# Patient Record
Sex: MALE | Race: WHITE | Employment: FULL TIME | URBAN - METROPOLITAN AREA
[De-identification: names, ages, dates, MRNs, and addresses within clinical notes are randomized per-mention and may not be internally consistent; named-entity substitution may affect disease eponyms.]

---

## 2021-11-30 ENCOUNTER — APPOINTMENT (OUTPATIENT)
Dept: GENERAL RADIOLOGY | Age: 71
End: 2021-11-30
Payer: COMMERCIAL

## 2021-11-30 ENCOUNTER — HOSPITAL ENCOUNTER (EMERGENCY)
Age: 71
Discharge: HOME | End: 2021-11-30
Payer: COMMERCIAL

## 2021-11-30 VITALS
OXYGEN SATURATION: 97 % | TEMPERATURE: 98.3 F | RESPIRATION RATE: 20 BRPM | DIASTOLIC BLOOD PRESSURE: 82 MMHG | BODY MASS INDEX: 29.89 KG/M2 | HEART RATE: 66 BPM | HEIGHT: 66 IN | WEIGHT: 186 LBS | SYSTOLIC BLOOD PRESSURE: 148 MMHG

## 2021-11-30 DIAGNOSIS — R00.2 PALPITATIONS: Primary | ICD-10-CM

## 2021-11-30 LAB
A/G RATIO: 1.3 (ref 1.1–2.2)
ALBUMIN SERPL-MCNC: 4 G/DL (ref 3.4–5)
ALP BLD-CCNC: 57 U/L (ref 40–129)
ALT SERPL-CCNC: 22 U/L (ref 10–40)
ANION GAP SERPL CALCULATED.3IONS-SCNC: 11 MMOL/L (ref 3–16)
AST SERPL-CCNC: 18 U/L (ref 15–37)
BASOPHILS ABSOLUTE: 0 K/UL (ref 0–0.2)
BASOPHILS RELATIVE PERCENT: 0.5 %
BILIRUB SERPL-MCNC: 1.1 MG/DL (ref 0–1)
BILIRUBIN URINE: NEGATIVE
BLOOD, URINE: NEGATIVE
BUN BLDV-MCNC: 14 MG/DL (ref 7–20)
CALCIUM SERPL-MCNC: 9.6 MG/DL (ref 8.3–10.6)
CHLORIDE BLD-SCNC: 100 MMOL/L (ref 99–110)
CLARITY: CLEAR
CO2: 26 MMOL/L (ref 21–32)
COLOR: YELLOW
CREAT SERPL-MCNC: 1 MG/DL (ref 0.8–1.3)
EOSINOPHILS ABSOLUTE: 0.2 K/UL (ref 0–0.6)
EOSINOPHILS RELATIVE PERCENT: 2.6 %
GFR AFRICAN AMERICAN: >60
GFR NON-AFRICAN AMERICAN: >60
GLUCOSE BLD-MCNC: 103 MG/DL (ref 70–99)
GLUCOSE URINE: NEGATIVE MG/DL
HCT VFR BLD CALC: 46.5 % (ref 40.5–52.5)
HEMOGLOBIN: 14.7 G/DL (ref 13.5–17.5)
KETONES, URINE: NEGATIVE MG/DL
LEUKOCYTE ESTERASE, URINE: NEGATIVE
LYMPHOCYTES ABSOLUTE: 1.3 K/UL (ref 1–5.1)
LYMPHOCYTES RELATIVE PERCENT: 20 %
MCH RBC QN AUTO: 25.8 PG (ref 26–34)
MCHC RBC AUTO-ENTMCNC: 31.6 G/DL (ref 31–36)
MCV RBC AUTO: 81.5 FL (ref 80–100)
MICROSCOPIC EXAMINATION: NORMAL
MONOCYTES ABSOLUTE: 0.6 K/UL (ref 0–1.3)
MONOCYTES RELATIVE PERCENT: 9.2 %
NEUTROPHILS ABSOLUTE: 4.4 K/UL (ref 1.7–7.7)
NEUTROPHILS RELATIVE PERCENT: 67.7 %
NITRITE, URINE: NEGATIVE
PDW BLD-RTO: 15.8 % (ref 12.4–15.4)
PH UA: 6.5 (ref 5–8)
PLATELET # BLD: 210 K/UL (ref 135–450)
PMV BLD AUTO: 8.4 FL (ref 5–10.5)
POTASSIUM SERPL-SCNC: 3.8 MMOL/L (ref 3.5–5.1)
PRO-BNP: 61 PG/ML (ref 0–124)
PROTEIN UA: NEGATIVE MG/DL
RBC # BLD: 5.7 M/UL (ref 4.2–5.9)
SODIUM BLD-SCNC: 137 MMOL/L (ref 136–145)
SPECIFIC GRAVITY UA: 1.02 (ref 1–1.03)
TOTAL PROTEIN: 7.1 G/DL (ref 6.4–8.2)
TROPONIN: <0.01 NG/ML
URINE TYPE: NORMAL
UROBILINOGEN, URINE: 0.2 E.U./DL
WBC # BLD: 6.5 K/UL (ref 4–11)

## 2021-11-30 PROCEDURE — 80053 COMPREHEN METABOLIC PANEL: CPT

## 2021-11-30 PROCEDURE — 81003 URINALYSIS AUTO W/O SCOPE: CPT

## 2021-11-30 PROCEDURE — 85025 COMPLETE CBC W/AUTO DIFF WBC: CPT

## 2021-11-30 PROCEDURE — 99283 EMERGENCY DEPT VISIT LOW MDM: CPT

## 2021-11-30 PROCEDURE — 93005 ELECTROCARDIOGRAM TRACING: CPT | Performed by: EMERGENCY MEDICINE

## 2021-11-30 PROCEDURE — 71045 X-RAY EXAM CHEST 1 VIEW: CPT

## 2021-11-30 PROCEDURE — 83880 ASSAY OF NATRIURETIC PEPTIDE: CPT

## 2021-11-30 PROCEDURE — 84484 ASSAY OF TROPONIN QUANT: CPT

## 2021-11-30 ASSESSMENT — PAIN SCALES - GENERAL: PAINLEVEL_OUTOF10: 0

## 2021-12-01 LAB
EKG ATRIAL RATE: 71 BPM
EKG DIAGNOSIS: NORMAL
EKG P AXIS: 31 DEGREES
EKG P-R INTERVAL: 188 MS
EKG Q-T INTERVAL: 352 MS
EKG QRS DURATION: 76 MS
EKG QTC CALCULATION (BAZETT): 382 MS
EKG R AXIS: -15 DEGREES
EKG T AXIS: 5 DEGREES
EKG VENTRICULAR RATE: 71 BPM

## 2021-12-01 PROCEDURE — 93010 ELECTROCARDIOGRAM REPORT: CPT | Performed by: INTERNAL MEDICINE

## 2021-12-01 NOTE — ED PROVIDER NOTES
[facility] Emergency Department    CHIEF COMPLAINT  Palpitations (pt reports fluttering in chest intermittently \"for awhile\". hx of WPW. pt reports that he had an episode today of fluttering and then took BP and it was 170's/150's)      SHARED SERVICE VISIT  Evaluated by FERNANDO. My supervising physician was available for consultation. EKG interpretation provided by attending physician. HISTORY OF PRESENT ILLNESS  Mary Sevilla is a 73 y.o. male who presents to the ED complaining of several hour history of palpitations and elevated blood pressure. Patient accompanied by wife today who brought him in for evaluation. Patient reports history of WT PW.  States that he also has history of hypertension. Noticed some palpitations today and when checking blood pressure reports that it was elevated. Does occasionally have some shortness of breath and lightheadedness although has experienced none today. No cough congestion. He denies any fevers chills. No headaches or confusion. Denies any associated chest pain. No neck, arm, jaw or arm or jaw discomfort. He denies abdominal pain. No leg pain or swelling. No recent travel, trauma or surgery. No pain with deep breaths. No other complaints, modifying factors or associated symptoms. Nursing notes reviewed.    Past Medical History:   Diagnosis Date    Difficult intubation 05/29/14    hx laryngospasm initiated by reflux    Gout     hands and legs    Hamstring injury 7/28/2014    Hearing decreased     Hypertension     Non smoker / tobacco user     Pneumonia     Reflux     Wears glasses     Beckford-Parkinson-White syndrome      Past Surgical History:   Procedure Laterality Date    ABDOMEN SURGERY      pt swallowed marble at age 5    CARDIAC SURGERY  2004    cardiac ablation    COLONOSCOPY      ENDOSCOPY, COLON, DIAGNOSTIC  6/14    wnl    HERNIA REPAIR Bilateral 1/2013/1954    KNEE SURGERY Left 2008    OTHER SURGICAL HISTORY Right 8/20/14    open hamstring reattachment    SKIN BIOPSY  unknown    TONSILLECTOMY      childhood    WISDOM TOOTH EXTRACTION  1973     Family History   Problem Relation Age of Onset    Arthritis Mother     Other Father     Heart Disease Father     Stroke Maternal Grandfather      Social History     Socioeconomic History    Marital status:      Spouse name: Not on file    Number of children: Not on file    Years of education: Not on file    Highest education level: Not on file   Occupational History    Not on file   Tobacco Use    Smoking status: Never Smoker    Smokeless tobacco: Not on file   Substance and Sexual Activity    Alcohol use: No    Drug use: No    Sexual activity: Not on file   Other Topics Concern    Not on file   Social History Narrative    Not on file     Social Determinants of Health     Financial Resource Strain:     Difficulty of Paying Living Expenses: Not on file   Food Insecurity:     Worried About Running Out of Food in the Last Year: Not on file    Erum of Food in the Last Year: Not on file   Transportation Needs:     Lack of Transportation (Medical): Not on file    Lack of Transportation (Non-Medical):  Not on file   Physical Activity:     Days of Exercise per Week: Not on file    Minutes of Exercise per Session: Not on file   Stress:     Feeling of Stress : Not on file   Social Connections:     Frequency of Communication with Friends and Family: Not on file    Frequency of Social Gatherings with Friends and Family: Not on file    Attends Orthodox Services: Not on file    Active Member of Clubs or Organizations: Not on file    Attends Club or Organization Meetings: Not on file    Marital Status: Not on file   Intimate Partner Violence:     Fear of Current or Ex-Partner: Not on file    Emotionally Abused: Not on file    Physically Abused: Not on file    Sexually Abused: Not on file   Housing Stability:     Unable to Pay for Housing in the Last Year: Not on file    Number of Places Lived in the Last Year: Not on file    Unstable Housing in the Last Year: Not on file     No current facility-administered medications for this encounter. Current Outpatient Medications   Medication Sig Dispense Refill    pantoprazole (PROTONIX) 40 MG tablet Take 1 tablet by mouth 2 times daily (before meals). 60 tablet 2    ALLOPURINOL PO Take  by mouth daily. Unknown dose      albuterol (PROVENTIL HFA;VENTOLIN HFA) 108 (90 BASE) MCG/ACT inhaler Inhale 2 puffs into the lungs every 6 hours as needed for Wheezing.  losartan (COZAAR) 25 MG tablet Take 50 mg by mouth daily.  metoprolol (TOPROL-XL) 25 MG XL tablet Take 100 mg by mouth daily. No Known Allergies    REVIEW OF SYSTEMS  10 systems reviewed, pertinent positives per HPI otherwise noted to be negative    PHYSICAL EXAM  BP (!) 148/82   Pulse 66   Temp 98.3 °F (36.8 °C) (Oral)   Resp 20   Ht 5' 6\" (1.676 m)   Wt 186 lb (84.4 kg)   SpO2 97%   BMI 30.02 kg/m²   GENERAL APPEARANCE: Awake and alert. Cooperative. No acute distress. HEAD: Normocephalic. Atraumatic. EYES: PERRL. EOM's grossly intact. ENT: Mucous membranes are moist.   NECK: Supple. no JVD. No tracheal tenderness or deviation. No crepitus. HEART: RRR. No murmurs. No chest wall tenderness. No wheezing, rhonchi, rales. LUNGS: Respirations unlabored. CTAB. Good air exchange. Speaking comfortably in full sentences. ABDOMEN: Soft. Non-distended. Non-tender. No guarding or rebound. No masses. No organomegaly. No midline pulsatile mass. No unilateral calf pain, redness or swelling. EXTREMITIES: No peripheral edema. Moves all extremities equally. All extremities neurovascularly intact. SKIN: Warm and dry. No acute rashes. NEUROLOGICAL: Alert and oriented. CN's 2-12 intact. No gross facial drooping. Strength 5/5, sensation intact. PSYCHIATRIC: Normal mood and affect.     RADIOLOGY  XR CHEST PORTABLE    Result Date: 11/30/2021  EXAMINATION: ONE XRAY VIEW OF THE CHEST 11/30/2021 6:54 pm COMPARISON: None. HISTORY: ORDERING SYSTEM PROVIDED HISTORY: palpitations TECHNOLOGIST PROVIDED HISTORY: Reason for exam:->palpitations Reason for Exam: palpitations Acuity: Acute Type of Exam: Initial FINDINGS: The left ventricle is mildly enlarged. The pulmonary vessels are normal. There mild linear densities scattered along the lung bases. There is a small hiatal hernia. No consolidation or effusion is seen. Mild left ventricular enlargement Mild discoid atelectasis or scarring along the lung bases. ED COURSE  Pain control was not required while here in the emergency department. .  Triage vitals Stable. Blood pressure 152/101. CBC without leukocytosis or anemia. CMP unremarkable. Troponin less than 0.01. BNP was 6 1. Urinalysis unremarkable. Chest x-ray with mild left ventricular enlargement. No evidence for acute cardiopulmonary disease. EKG was a normal sinus rhythm without evidence for ischemic changes. Patient ambulatory without tachycardia or hypoxia. Reports that he does have upcoming cardiology follow-up and again was just concerned with some palpitations. He has had no chest discomfort and would like to go home at this time. We have discussed return precautions as well as recommendations for follow-up and patient is in agreement and comfortable at discharge. A discussion was had with Mr. Tran Maharaj regarding palpitations and elevated blood pressure, ED findings and recommendations for follow-up. Risk management discussed and shared decision making had with patient and/or surrogate. All questions were answered. Patient will follow up with cardiology within several days for further evaluation/treatment. All questions answered. Patient will return to ED for new/worsening symptoms. Patient was informed to continue home medications as prescribed.     MDM  Results for orders placed or performed during the hospital encounter of 11/30/21   CBC auto differential   Result Value Ref Range    WBC 6.5 4.0 - 11.0 K/uL    RBC 5.70 4.20 - 5.90 M/uL    Hemoglobin 14.7 13.5 - 17.5 g/dL    Hematocrit 46.5 40.5 - 52.5 %    MCV 81.5 80.0 - 100.0 fL    MCH 25.8 (L) 26.0 - 34.0 pg    MCHC 31.6 31.0 - 36.0 g/dL    RDW 15.8 (H) 12.4 - 15.4 %    Platelets 829 580 - 690 K/uL    MPV 8.4 5.0 - 10.5 fL    Neutrophils % 67.7 %    Lymphocytes % 20.0 %    Monocytes % 9.2 %    Eosinophils % 2.6 %    Basophils % 0.5 %    Neutrophils Absolute 4.4 1.7 - 7.7 K/uL    Lymphocytes Absolute 1.3 1.0 - 5.1 K/uL    Monocytes Absolute 0.6 0.0 - 1.3 K/uL    Eosinophils Absolute 0.2 0.0 - 0.6 K/uL    Basophils Absolute 0.0 0.0 - 0.2 K/uL   Comprehensive metabolic panel   Result Value Ref Range    Sodium 137 136 - 145 mmol/L    Potassium 3.8 3.5 - 5.1 mmol/L    Chloride 100 99 - 110 mmol/L    CO2 26 21 - 32 mmol/L    Anion Gap 11 3 - 16    Glucose 103 (H) 70 - 99 mg/dL    BUN 14 7 - 20 mg/dL    CREATININE 1.0 0.8 - 1.3 mg/dL    GFR Non-African American >60 >60    GFR African American >60 >60    Calcium 9.6 8.3 - 10.6 mg/dL    Total Protein 7.1 6.4 - 8.2 g/dL    Albumin 4.0 3.4 - 5.0 g/dL    Albumin/Globulin Ratio 1.3 1.1 - 2.2    Total Bilirubin 1.1 (H) 0.0 - 1.0 mg/dL    Alkaline Phosphatase 57 40 - 129 U/L    ALT 22 10 - 40 U/L    AST 18 15 - 37 U/L   Troponin   Result Value Ref Range    Troponin <0.01 <0.01 ng/mL   Urinalysis   Result Value Ref Range    Color, UA Yellow Straw/Yellow    Clarity, UA Clear Clear    Glucose, Ur Negative Negative mg/dL    Bilirubin Urine Negative Negative    Ketones, Urine Negative Negative mg/dL    Specific Gravity, UA 1.020 1.005 - 1.030    Blood, Urine Negative Negative    pH, UA 6.5 5.0 - 8.0    Protein, UA Negative Negative mg/dL    Urobilinogen, Urine 0.2 <2.0 E.U./dL    Nitrite, Urine Negative Negative    Leukocyte Esterase, Urine Negative Negative    Microscopic Examination Not Indicated     Urine Type NotGiven    Brain Natriuretic Peptide   Result Value Ref Range    Pro-BNP 61 0 - 124 pg/mL   EKG 12 Lead   Result Value Ref Range    Ventricular Rate 71 BPM    Atrial Rate 71 BPM    P-R Interval 188 ms    QRS Duration 76 ms    Q-T Interval 352 ms    QTc Calculation (Bazett) 382 ms    P Axis 31 degrees    R Axis -15 degrees    T Axis 5 degrees    Diagnosis       Normal sinus rhythmModerate voltage criteria for LVH, may be normal variantBorderline ECGWhen compared with ECG of 23-MAY-2014 08:27,pattern of Laury-Parkinson-White is no longer PresentConfirmed by Geetha Mcclure MD, [address] ([id number]) on 12/1/2021 5:34:23 AM     I estimate there is LOW risk for ACUTE CORONARY SYNDROME, INTRACRANIAL HEMORRHAGE, MALIGNANT DYSRHYTHMIA or HYPERTENSION, PULMONARY EMBOLISM, SEPSIS, SUBARACHNOID HEMORRHAGE, SUBDURAL HEMATOMA, STROKE, or THORACIC AORTIC DISSECTION, thus I consider the discharge disposition reasonable. Blanca Elizondo and I have discussed the diagnosis and risks, and we agree with discharging home to follow-up with their primary doctor. We also discussed returning to the Emergency Department immediately if new or worsening symptoms occur. We have discussed the symptoms which are most concerning (e.g., bloody sputum, fever, worsening pain or shortness of breath, vomiting, weakness) that necessitate immediate return. Final Impression  1. Palpitations      Blood pressure (!) 148/82, pulse 66, temperature 98.3 °F (36.8 °C), temperature source Oral, resp. rate 20, height 5' 6\" (1.676 m), weight 186 lb (84.4 kg), SpO2 97 %. DISPOSITION  Patient was discharged to home in good condition.           [place], [place]  12/01/21 [id number]

## 2021-12-01 NOTE — ED NOTES
Discharge instructions reviewed with Pt. Pt verbalizes understanding at this time. VS as noted. Pt condition stable at this time. No concerns voiced.       Joaquin Patterson RN  11/30/21 [id number]